# Patient Record
Sex: FEMALE | Race: WHITE | NOT HISPANIC OR LATINO | URBAN - METROPOLITAN AREA
[De-identification: names, ages, dates, MRNs, and addresses within clinical notes are randomized per-mention and may not be internally consistent; named-entity substitution may affect disease eponyms.]

---

## 2017-07-09 ENCOUNTER — EMERGENCY (EMERGENCY)
Facility: HOSPITAL | Age: 82
LOS: 1 days | End: 2017-07-09
Payer: MEDICARE

## 2017-07-09 PROCEDURE — 99285 EMERGENCY DEPT VISIT HI MDM: CPT

## 2017-07-09 PROCEDURE — 74176 CT ABD & PELVIS W/O CONTRAST: CPT | Mod: 26

## 2017-12-19 ENCOUNTER — INPATIENT (INPATIENT)
Facility: HOSPITAL | Age: 82
LOS: 0 days | Discharge: HOME | End: 2017-12-20
Payer: MEDICARE

## 2017-12-19 PROCEDURE — 99285 EMERGENCY DEPT VISIT HI MDM: CPT

## 2017-12-19 PROCEDURE — 71275 CT ANGIOGRAPHY CHEST: CPT | Mod: 26

## 2017-12-19 PROCEDURE — 72128 CT CHEST SPINE W/O DYE: CPT | Mod: 26

## 2017-12-19 PROCEDURE — 74177 CT ABD & PELVIS W/CONTRAST: CPT | Mod: 26

## 2017-12-20 ENCOUNTER — OUTPATIENT (OUTPATIENT)
Dept: OUTPATIENT SERVICES | Facility: HOSPITAL | Age: 82
LOS: 1 days | End: 2017-12-20

## 2018-02-12 ENCOUNTER — OUTPATIENT (OUTPATIENT)
Dept: OUTPATIENT SERVICES | Facility: HOSPITAL | Age: 83
LOS: 1 days | End: 2018-02-12

## 2018-02-20 ENCOUNTER — OUTPATIENT (OUTPATIENT)
Dept: OUTPATIENT SERVICES | Facility: HOSPITAL | Age: 83
LOS: 1 days | End: 2018-02-20

## 2019-07-23 ENCOUNTER — APPOINTMENT (OUTPATIENT)
Dept: RADIOLOGY | Facility: CLINIC | Age: 84
End: 2019-07-23
Payer: MEDICARE

## 2019-07-23 PROBLEM — Z00.00 ENCOUNTER FOR PREVENTIVE HEALTH EXAMINATION: Status: ACTIVE | Noted: 2019-07-23

## 2019-07-23 PROCEDURE — 74019 RADEX ABDOMEN 2 VIEWS: CPT

## 2019-07-31 ENCOUNTER — APPOINTMENT (OUTPATIENT)
Dept: ULTRASOUND IMAGING | Facility: CLINIC | Age: 84
End: 2019-07-31
Payer: MEDICARE

## 2019-07-31 PROCEDURE — 76700 US EXAM ABDOM COMPLETE: CPT

## 2019-08-03 ENCOUNTER — OUTPATIENT (OUTPATIENT)
Dept: OUTPATIENT SERVICES | Facility: HOSPITAL | Age: 84
LOS: 1 days | End: 2019-08-03

## 2019-08-03 ENCOUNTER — INPATIENT (INPATIENT)
Facility: HOSPITAL | Age: 84
LOS: 2 days | Discharge: HOME | End: 2019-08-06
Attending: INTERNAL MEDICINE
Payer: MEDICARE

## 2019-08-03 PROCEDURE — 99291 CRITICAL CARE FIRST HOUR: CPT

## 2019-08-03 PROCEDURE — 70450 CT HEAD/BRAIN W/O DYE: CPT | Mod: 26

## 2019-08-03 PROCEDURE — 71045 X-RAY EXAM CHEST 1 VIEW: CPT | Mod: 26

## 2019-08-03 PROCEDURE — 72125 CT NECK SPINE W/O DYE: CPT | Mod: 26

## 2019-08-04 ENCOUNTER — OUTPATIENT (OUTPATIENT)
Dept: OUTPATIENT SERVICES | Facility: HOSPITAL | Age: 84
LOS: 1 days | End: 2019-08-04

## 2019-08-04 PROCEDURE — 93010 ELECTROCARDIOGRAM REPORT: CPT

## 2019-08-05 ENCOUNTER — OUTPATIENT (OUTPATIENT)
Dept: OUTPATIENT SERVICES | Facility: HOSPITAL | Age: 84
LOS: 1 days | End: 2019-08-05

## 2019-08-06 ENCOUNTER — OUTPATIENT (OUTPATIENT)
Dept: OUTPATIENT SERVICES | Facility: HOSPITAL | Age: 84
LOS: 1 days | End: 2019-08-06

## 2019-12-03 ENCOUNTER — APPOINTMENT (OUTPATIENT)
Dept: ULTRASOUND IMAGING | Facility: CLINIC | Age: 84
End: 2019-12-03
Payer: MEDICARE

## 2019-12-03 PROCEDURE — 93970 EXTREMITY STUDY: CPT

## 2020-01-16 ENCOUNTER — APPOINTMENT (OUTPATIENT)
Dept: CT IMAGING | Facility: CLINIC | Age: 85
End: 2020-01-16
Payer: MEDICARE

## 2020-01-16 PROCEDURE — 74177 CT ABD & PELVIS W/CONTRAST: CPT

## 2021-04-09 ENCOUNTER — APPOINTMENT (OUTPATIENT)
Dept: ULTRASOUND IMAGING | Facility: CLINIC | Age: 86
End: 2021-04-09
Payer: MEDICARE

## 2021-04-09 PROCEDURE — 93971 EXTREMITY STUDY: CPT | Mod: LT

## 2021-12-09 ENCOUNTER — APPOINTMENT (OUTPATIENT)
Dept: ORTHOPEDIC SURGERY | Facility: CLINIC | Age: 86
End: 2021-12-09

## 2022-02-14 ENCOUNTER — APPOINTMENT (OUTPATIENT)
Dept: ORTHOPEDIC SURGERY | Facility: CLINIC | Age: 87
End: 2022-02-14
Payer: MEDICARE

## 2022-02-14 VITALS — WEIGHT: 135 LBS | HEIGHT: 63 IN | TEMPERATURE: 97.4 F | BODY MASS INDEX: 23.92 KG/M2

## 2022-02-14 DIAGNOSIS — H54.8 LEGAL BLINDNESS, AS DEFINED IN USA: ICD-10-CM

## 2022-02-14 DIAGNOSIS — Z96.649 PRESENCE OF UNSPECIFIED ARTIFICIAL HIP JOINT: ICD-10-CM

## 2022-02-14 DIAGNOSIS — Z87.891 PERSONAL HISTORY OF NICOTINE DEPENDENCE: ICD-10-CM

## 2022-02-14 DIAGNOSIS — Z82.49 FAMILY HISTORY OF ISCHEMIC HEART DISEASE AND OTHER DISEASES OF THE CIRCULATORY SYSTEM: ICD-10-CM

## 2022-02-14 DIAGNOSIS — Z83.438 FAMILY HISTORY OF OTHER DISORDER OF LIPOPROTEIN METABOLISM AND OTHER LIPIDEMIA: ICD-10-CM

## 2022-02-14 DIAGNOSIS — Z83.3 FAMILY HISTORY OF DIABETES MELLITUS: ICD-10-CM

## 2022-02-14 DIAGNOSIS — M18.12 UNILATERAL PRIMARY OSTEOARTHRITIS OF FIRST CARPOMETACARPAL JOINT, LEFT HAND: ICD-10-CM

## 2022-02-14 DIAGNOSIS — Z78.9 OTHER SPECIFIED HEALTH STATUS: ICD-10-CM

## 2022-02-14 DIAGNOSIS — Z86.2 PERSONAL HISTORY OF DISEASES OF THE BLOOD AND BLOOD-FORMING ORGANS AND CERTAIN DISORDERS INVOLVING THE IMMUNE MECHANISM: ICD-10-CM

## 2022-02-14 DIAGNOSIS — Z86.79 PERSONAL HISTORY OF OTHER DISEASES OF THE CIRCULATORY SYSTEM: ICD-10-CM

## 2022-02-14 PROCEDURE — 73130 X-RAY EXAM OF HAND: CPT | Mod: LT

## 2022-02-14 PROCEDURE — 20606 DRAIN/INJ JOINT/BURSA W/US: CPT | Mod: LT

## 2022-02-14 PROCEDURE — 99204 OFFICE O/P NEW MOD 45 MIN: CPT | Mod: 25

## 2022-02-14 RX ORDER — LATANOPROST/PF 0.005 %
0.01 DROPS OPHTHALMIC (EYE)
Refills: 0 | Status: ACTIVE | COMMUNITY

## 2022-02-14 RX ORDER — DORZOLAMIDE HYDROCHLORIDE AND TIMOLOL MALEATE 20; 5 MG/ML; MG/ML
SOLUTION/ DROPS OPHTHALMIC
Refills: 0 | Status: ACTIVE | COMMUNITY

## 2022-02-14 NOTE — PHYSICAL EXAM
[de-identified] : - Constitutional: This is a female in no obvious distress. She was accompanied by her son today.\par - She is blind. \par - Psych: Patient is alert and oriented to person, place and time.  Patient has a normal mood and affect.\par - Cardiovascular: Normal pulses throughout the upper extremities.  No significant varicosities are noted in the upper extremities. \par - Neuro: Strength and sensation are intact throughout the upper extremities.  Patient has normal coordination.\par - Respiratory:  Patient exhibits no evidence of shortness of breath or difficulty breathing.\par - Skin: No rashes, lesions, or other abnormalities are noted in the upper extremities.\par \par ---\par \par Side: Left Thumb\par -  There is swelling and tenderness along the basal joint of the thumb.  \par -  There is a positive grind test.  \par -  There is no instability of the basal joint of the thumb.  \par -  There is no evidence of a trigger thumb.  \par -  There is no evidence of DeQuervain's tendonitis.  \par -  Provocative signs for carpal tunnel syndrome are negative.  \par -  There is normal strength and sensation distally along the radial, ulnar and median nerve distributions.  \par -  There is full composite range-of-motion of the other digits into the palm.    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

## 2022-02-14 NOTE — END OF VISIT
[FreeTextEntry3] : This note was written by Melo Champion on 02/14/2022 acting solely as a scribe for Dr. Graham Parra.\par  \par All medical record entries made by the Scribe were at my, Dr. Graham Parra, direction and personally dictated by me on 02/14/2022. I have personally reviewed the chart and agree that the record accurately reflects my personal performance of the history, physical exam.

## 2022-02-14 NOTE — HISTORY OF PRESENT ILLNESS
[Right] : right hand dominant [FreeTextEntry1] : She comes in today for evaluation of left thumb pain  She states that she has difficulty grasping due to pain. She rates her pain a 5 to 6 out of 10 at this time. \par \par She has a history of type 2 diabetes.  She is also blind.\par \par She was accompanied by her son today.

## 2022-02-14 NOTE — ADDENDUM
[FreeTextEntry1] : I, Melo Champion, acted solely as a scribe for Dr. Parra on this date on 02/14/2022.

## 2022-02-14 NOTE — DISCUSSION/SUMMARY
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 At this time, she agreed to proceed with a cortisone injection. She understands that as a diabetic, this will likely elevate her blood sugars in the short-term.\par \par The patient has agreed to the above plan of management and has expressed full understanding.  All questions were fully answered to the patient's satisfaction. \par \par My cumulative time spent on this visit included: Preparation for the visit, review of the medical records, review of pertinent diagnostic studies, examination and counseling of the patient on the above diagnosis, treatment plan and prognosis, orders of diagnostic tests, medication and/or appropriate procedures and documentation in the medical records of today's visit.

## 2022-02-14 NOTE — PROCEDURE
[FreeTextEntry1] :  - After a discussion of risks and benefits, the patient agreed to proceed with a cortisone injection.  \par -  Side Injected: Left thumb carpometacarpal joint.\par -  Medications injected: 0.5cc of 1% Lidocaine and 1cc of 40mg of Depomedrol, using sterile technique.\par - Ultrasound Guidance: Ultrasound guidance was used, because of anatomical considerations and deformity, to confirm correct localization of the needle within the carpometacarpal joint, prior to the injection.  \par -  Patient tolerated the procedure well, without complications.\par -  Patient noted immediate relief of the symptoms, secondary to the anesthetic effects of the injection.\par -  Patient was told that the pain may worsen for a day or two, and should then begin to improve.\par -  Instructions: The patient was instructed on the use of ice, anti-inflammatory agents, or Tylenol, and activity modification.\par -  Follow-up: Within 4 weeks to assess the response to the injection.

## 2022-10-07 ENCOUNTER — APPOINTMENT (OUTPATIENT)
Dept: ORTHOPEDIC SURGERY | Facility: CLINIC | Age: 87
End: 2022-10-07

## 2022-11-21 ENCOUNTER — OFFICE (OUTPATIENT)
Dept: URBAN - METROPOLITAN AREA CLINIC 103 | Facility: CLINIC | Age: 87
Setting detail: OPHTHALMOLOGY
End: 2022-11-21
Payer: MEDICARE

## 2022-11-21 DIAGNOSIS — H04.123: ICD-10-CM

## 2022-11-21 DIAGNOSIS — H40.2213: ICD-10-CM

## 2022-11-21 DIAGNOSIS — Z96.1: ICD-10-CM

## 2022-11-21 DIAGNOSIS — H40.2222: ICD-10-CM

## 2022-11-21 PROCEDURE — 92012 INTRM OPH EXAM EST PATIENT: CPT | Performed by: OPHTHALMOLOGY

## 2022-11-21 ASSESSMENT — REFRACTION_AUTOREFRACTION
OS_CYLINDER: -1.75
OD_SPHERE: UTP
OS_AXIS: 093
OS_SPHERE: -0.50

## 2022-11-21 ASSESSMENT — KERATOMETRY
OD_K2POWER_DIOPTERS: 47.50
METHOD_AUTO_MANUAL: AUTO
OS_K2POWER_DIOPTERS: 48.00
OD_AXISANGLE_DEGREES: 006
OS_AXISANGLE_DEGREES: 004
OD_K1POWER_DIOPTERS: 43.75
OS_K1POWER_DIOPTERS: 46.50

## 2022-11-21 ASSESSMENT — SPHEQUIV_DERIVED: OS_SPHEQUIV: -1.375

## 2022-11-21 ASSESSMENT — CONFRONTATIONAL VISUAL FIELD TEST (CVF)
OD_FINDINGS: FULL
OS_FINDINGS: FULL

## 2022-11-21 ASSESSMENT — DRY EYES - PHYSICIAN NOTES
OS_GENERALCOMMENTS: DIFFUSE
OD_GENERALCOMMENTS: DIFFUSE

## 2022-11-21 ASSESSMENT — VISUAL ACUITY
OD_BCVA: 20/350
OS_BCVA: NLP

## 2022-11-21 ASSESSMENT — SUPERFICIAL PUNCTATE KERATITIS (SPK)
OD_SPK: 3+
OS_SPK: 3+

## 2022-11-21 ASSESSMENT — AXIALLENGTH_DERIVED: OS_AL: 22.7792

## 2022-11-21 ASSESSMENT — PACHYMETRY
OD_CT_CORRECTION: 1
OD_CT_UM: 534

## 2022-11-21 ASSESSMENT — TONOMETRY
OD_IOP_MMHG: 20
OS_IOP_MMHG: 12

## 2022-12-12 ENCOUNTER — OFFICE (OUTPATIENT)
Dept: URBAN - METROPOLITAN AREA CLINIC 105 | Facility: CLINIC | Age: 87
Setting detail: OPHTHALMOLOGY
End: 2022-12-12
Payer: MEDICARE

## 2022-12-12 DIAGNOSIS — H20.9: ICD-10-CM

## 2022-12-12 DIAGNOSIS — E11.3312: ICD-10-CM

## 2022-12-12 DIAGNOSIS — E11.3291: ICD-10-CM

## 2022-12-12 DIAGNOSIS — H43.812: ICD-10-CM

## 2022-12-12 PROCEDURE — 67028 INJECTION EYE DRUG: CPT | Performed by: OPHTHALMOLOGY

## 2022-12-12 PROCEDURE — 92250 FUNDUS PHOTOGRAPHY W/I&R: CPT | Performed by: OPHTHALMOLOGY

## 2022-12-12 ASSESSMENT — CONFRONTATIONAL VISUAL FIELD TEST (CVF)
OD_FINDINGS: FULL
OS_FINDINGS: FULL

## 2022-12-12 ASSESSMENT — KERATOMETRY
OS_AXISANGLE_DEGREES: 004
OS_K1POWER_DIOPTERS: 46.50
OS_K2POWER_DIOPTERS: 48.00
OD_AXISANGLE_DEGREES: 006
METHOD_AUTO_MANUAL: AUTO
OD_K1POWER_DIOPTERS: 43.75
OD_K2POWER_DIOPTERS: 47.50

## 2022-12-12 ASSESSMENT — AXIALLENGTH_DERIVED: OS_AL: 22.7792

## 2022-12-12 ASSESSMENT — REFRACTION_AUTOREFRACTION
OS_CYLINDER: -1.75
OD_SPHERE: UTP
OS_SPHERE: -0.50
OS_AXIS: 093

## 2022-12-12 ASSESSMENT — DRY EYES - PHYSICIAN NOTES
OD_GENERALCOMMENTS: DIFFUSE
OS_GENERALCOMMENTS: DIFFUSE

## 2022-12-12 ASSESSMENT — SPHEQUIV_DERIVED: OS_SPHEQUIV: -1.375

## 2022-12-12 ASSESSMENT — SUPERFICIAL PUNCTATE KERATITIS (SPK)
OS_SPK: 3+
OD_SPK: 3+

## 2022-12-12 ASSESSMENT — VISUAL ACUITY
OD_BCVA: 20/CF@2FT
OS_BCVA: NLP

## 2022-12-29 ENCOUNTER — RESULT REVIEW (OUTPATIENT)
Age: 87
End: 2022-12-29

## 2022-12-29 DIAGNOSIS — E04.2 NONTOXIC MULTINODULAR GOITER: ICD-10-CM

## 2022-12-29 DIAGNOSIS — Z86.39 PERSONAL HISTORY OF OTHER ENDOCRINE, NUTRITIONAL AND METABOLIC DISEASE: ICD-10-CM

## 2023-01-06 ENCOUNTER — APPOINTMENT (OUTPATIENT)
Dept: ULTRASOUND IMAGING | Facility: CLINIC | Age: 88
End: 2023-01-06
Payer: MEDICARE

## 2023-01-06 PROCEDURE — 76536 US EXAM OF HEAD AND NECK: CPT

## 2023-01-10 DIAGNOSIS — M54.2 CERVICALGIA: ICD-10-CM

## 2023-02-13 ENCOUNTER — OFFICE (OUTPATIENT)
Dept: URBAN - METROPOLITAN AREA CLINIC 105 | Facility: CLINIC | Age: 88
Setting detail: OPHTHALMOLOGY
End: 2023-02-13
Payer: MEDICARE

## 2023-02-13 DIAGNOSIS — H43.812: ICD-10-CM

## 2023-02-13 DIAGNOSIS — E11.3312: ICD-10-CM

## 2023-02-13 DIAGNOSIS — E11.3291: ICD-10-CM

## 2023-02-13 DIAGNOSIS — H20.9: ICD-10-CM

## 2023-02-13 PROCEDURE — 92014 COMPRE OPH EXAM EST PT 1/>: CPT | Performed by: OPHTHALMOLOGY

## 2023-02-13 PROCEDURE — 92134 CPTRZ OPH DX IMG PST SGM RTA: CPT | Performed by: OPHTHALMOLOGY

## 2023-02-13 ASSESSMENT — REFRACTION_AUTOREFRACTION
OS_AXIS: 093
OS_SPHERE: -0.50
OS_CYLINDER: -1.75
OD_SPHERE: UTP

## 2023-02-13 ASSESSMENT — DRY EYES - PHYSICIAN NOTES
OD_GENERALCOMMENTS: DIFFUSE
OS_GENERALCOMMENTS: DIFFUSE

## 2023-02-13 ASSESSMENT — VISUAL ACUITY
OD_BCVA: 20/400
OS_BCVA: NLP

## 2023-02-13 ASSESSMENT — KERATOMETRY
OD_K2POWER_DIOPTERS: 47.50
OS_K1POWER_DIOPTERS: 46.50
OD_AXISANGLE_DEGREES: 006
METHOD_AUTO_MANUAL: AUTO
OD_K1POWER_DIOPTERS: 43.75
OS_AXISANGLE_DEGREES: 004
OS_K2POWER_DIOPTERS: 48.00

## 2023-02-13 ASSESSMENT — SUPERFICIAL PUNCTATE KERATITIS (SPK)
OS_SPK: 3+
OD_SPK: 3+

## 2023-02-13 ASSESSMENT — SPHEQUIV_DERIVED: OS_SPHEQUIV: -1.375

## 2023-02-13 ASSESSMENT — AXIALLENGTH_DERIVED: OS_AL: 22.7792

## 2023-02-13 ASSESSMENT — CONFRONTATIONAL VISUAL FIELD TEST (CVF)
OD_FINDINGS: FULL
OS_FINDINGS: FULL

## 2023-04-19 ENCOUNTER — APPOINTMENT (OUTPATIENT)
Dept: ENDOCRINOLOGY | Facility: CLINIC | Age: 88
End: 2023-04-19
Payer: MEDICARE

## 2023-04-19 VITALS
TEMPERATURE: 97.7 F | HEIGHT: 63 IN | HEART RATE: 50 BPM | RESPIRATION RATE: 14 BRPM | OXYGEN SATURATION: 99 % | SYSTOLIC BLOOD PRESSURE: 120 MMHG | BODY MASS INDEX: 23.04 KG/M2 | WEIGHT: 130 LBS | DIASTOLIC BLOOD PRESSURE: 58 MMHG

## 2023-04-19 PROCEDURE — 99214 OFFICE O/P EST MOD 30 MIN: CPT

## 2023-04-19 RX ORDER — METOPROLOL TARTRATE 5 MG/5ML
INJECTION, SOLUTION INTRAVENOUS
Refills: 0 | Status: DISCONTINUED | COMMUNITY
End: 2023-04-19

## 2023-04-19 NOTE — PHYSICAL EXAM
[Alert] : alert [Well Nourished] : well nourished [Normal Sclera/Conjunctiva] : normal sclera/conjunctiva [Normal Outer Ear/Nose] : the ears and nose were normal in appearance [Normal Hearing] : hearing was normal [No Neck Mass] : no neck mass was observed [No Thyroid Nodules] : no palpable thyroid nodules [Normal S1, S2] : normal S1 and S2 [Normal Rate] : heart rate was normal [Regular Rhythm] : with a regular rhythm [Pedal Pulses Normal] : the pedal pulses are present [Not Tender] : non-tender [Soft] : abdomen soft [No HSM] : no hepato-splenomegaly [Normal Supraclavicular Nodes] : no supraclavicular lymphadenopathy [No CVA Tenderness] : no ~M costovertebral angle tenderness [Normal Gait] : normal gait [No Clubbing, Cyanosis] : no clubbing  or cyanosis of the fingernails [No Joint Swelling] : no joint swelling seen [No Rash] : no rash [No Motor Deficits] : the motor exam was normal [Normal Reflexes] : deep tendon reflexes were 2+ and symmetric [No Tremors] : no tremors [Oriented x3] : oriented to person, place, and time [de-identified] : And is legally blind [de-identified] : Slight pitting edema more prominent of the left lower extremity [de-identified] : Deferred [FreeTextEntry1] : Deferred [de-identified] : Denies any polyuria or hypoglycemia [de-identified] : Deferred

## 2023-04-19 NOTE — HISTORY OF PRESENT ILLNESS
[FreeTextEntry1] : This is an 87-year-old white female with a past medical history of a type 2 diabetes hypertension, coronary artery disease and hyperlipidemia presents for routine follow-up.  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  Patient currently is taking glipizide 5 mg tablets every day.  Her main complaint is that she has been experiencing some upper chest pain which is radiating towards the neck area especially at night.  It is not very characteristic it resolves spontaneously.  He is not connected with any abdominal pain nausea vomiting heartburn.  Patient does not monitor her sugar levels due to her disability.  Patient had also been evaluated by her cardiologist who was of the opinion that her chest pain was originating from acid reflux review of systems otherwise negative.  She has not noticed any nausea vomiting abdominal pain but she does complain of mild swelling of the lower extremities.  Physically she is active at home.  She prepares her own meals.

## 2023-04-19 NOTE — ASSESSMENT
[Diabetes Foot Care] : diabetes foot care [Long Term Vascular Complications] : long term vascular complications of diabetes [Carbohydrate Consistent Diet] : carbohydrate consistent diet [Hypoglycemia Management] : hypoglycemia management [Exercise/Effect on Glucose] : exercise/effect on glucose [Self Monitoring of Blood Glucose] : self monitoring of blood glucose [FreeTextEntry1] : Elderly white female with a past medical history of a type 2 diabetes hypertension and coronary artery disease presents for routine follow-up.  Patient is now completely legally blind.  He has been complaining of some chest pain which has been radiating to both the upper neck and left shoulder area.  Work-up has been negative in the past especially an exercise stress test.  Recommendation\par Advised the patient to obtain a complete blood analysis.\par 2.  Patient is advised to follow-up with her GI specialist to rule out any possible gastric esophageal acid reflux.\par 3.  Patient will continue her current medication glipizide 5 mg tablets every day.  Patient is aware of the signs and symptoms of hypoglycemia and its management.\par 4.  Patient will meet with a cardiologist and will discuss her management of the anterior chest pain.  The plan was discussed in detail with the patient and her son thank you

## 2023-04-24 ENCOUNTER — OFFICE (OUTPATIENT)
Dept: URBAN - METROPOLITAN AREA CLINIC 105 | Facility: CLINIC | Age: 88
Setting detail: OPHTHALMOLOGY
End: 2023-04-24
Payer: MEDICARE

## 2023-04-24 DIAGNOSIS — E11.3312: ICD-10-CM

## 2023-04-24 DIAGNOSIS — H20.9: ICD-10-CM

## 2023-04-24 DIAGNOSIS — E11.3291: ICD-10-CM

## 2023-04-24 DIAGNOSIS — H43.812: ICD-10-CM

## 2023-04-24 PROCEDURE — 92012 INTRM OPH EXAM EST PATIENT: CPT | Performed by: OPHTHALMOLOGY

## 2023-04-24 PROCEDURE — 92134 CPTRZ OPH DX IMG PST SGM RTA: CPT | Performed by: OPHTHALMOLOGY

## 2023-04-24 ASSESSMENT — AXIALLENGTH_DERIVED: OS_AL: 22.7792

## 2023-04-24 ASSESSMENT — REFRACTION_AUTOREFRACTION
OS_CYLINDER: -1.75
OS_AXIS: 093
OD_SPHERE: UTP
OS_SPHERE: -0.50

## 2023-04-24 ASSESSMENT — VISUAL ACUITY
OS_BCVA: NLP
OD_BCVA: HM

## 2023-04-24 ASSESSMENT — KERATOMETRY
OS_K2POWER_DIOPTERS: 48.00
METHOD_AUTO_MANUAL: AUTO
OS_AXISANGLE_DEGREES: 004
OD_K1POWER_DIOPTERS: 43.75
OD_AXISANGLE_DEGREES: 006
OD_K2POWER_DIOPTERS: 47.50
OS_K1POWER_DIOPTERS: 46.50

## 2023-04-24 ASSESSMENT — CONFRONTATIONAL VISUAL FIELD TEST (CVF)
OD_FINDINGS: FULL
OS_FINDINGS: FULL

## 2023-04-24 ASSESSMENT — SUPERFICIAL PUNCTATE KERATITIS (SPK)
OS_SPK: 3+
OD_SPK: 3+

## 2023-04-24 ASSESSMENT — DRY EYES - PHYSICIAN NOTES
OD_GENERALCOMMENTS: DIFFUSE
OS_GENERALCOMMENTS: DIFFUSE

## 2023-04-24 ASSESSMENT — SPHEQUIV_DERIVED: OS_SPHEQUIV: -1.375

## 2023-05-08 ENCOUNTER — OFFICE (OUTPATIENT)
Dept: URBAN - METROPOLITAN AREA CLINIC 103 | Facility: CLINIC | Age: 88
Setting detail: OPHTHALMOLOGY
End: 2023-05-08
Payer: MEDICARE

## 2023-05-08 DIAGNOSIS — Z96.1: ICD-10-CM

## 2023-05-08 DIAGNOSIS — H04.123: ICD-10-CM

## 2023-05-08 DIAGNOSIS — H26.493: ICD-10-CM

## 2023-05-08 DIAGNOSIS — H40.2222: ICD-10-CM

## 2023-05-08 DIAGNOSIS — H40.2213: ICD-10-CM

## 2023-05-08 PROCEDURE — 92012 INTRM OPH EXAM EST PATIENT: CPT | Performed by: OPHTHALMOLOGY

## 2023-05-08 ASSESSMENT — DRY EYES - PHYSICIAN NOTES
OS_GENERALCOMMENTS: DIFFUSE
OD_GENERALCOMMENTS: DIFFUSE

## 2023-05-08 ASSESSMENT — CONFRONTATIONAL VISUAL FIELD TEST (CVF)
OS_FINDINGS: FULL
OD_FINDINGS: FULL

## 2023-05-08 ASSESSMENT — PACHYMETRY
OD_CT_CORRECTION: 1
OD_CT_UM: 534

## 2023-05-08 ASSESSMENT — SUPERFICIAL PUNCTATE KERATITIS (SPK)
OD_SPK: 3+
OS_SPK: 3+

## 2023-05-08 ASSESSMENT — TONOMETRY: OD_IOP_MMHG: 20

## 2023-05-12 ENCOUNTER — APPOINTMENT (OUTPATIENT)
Dept: RADIOLOGY | Facility: CLINIC | Age: 88
End: 2023-05-12
Payer: MEDICARE

## 2023-05-12 PROCEDURE — 72040 X-RAY EXAM NECK SPINE 2-3 VW: CPT

## 2023-05-16 ASSESSMENT — VISUAL ACUITY
OD_BCVA: HM
OS_BCVA: NLP

## 2023-05-16 ASSESSMENT — KERATOMETRY
OD_AXISANGLE_DEGREES: 180
OD_K1POWER_DIOPTERS: 43.00
OS_K2POWER_DIOPTERS: 47.50
OS_K1POWER_DIOPTERS: 45.50
OD_K2POWER_DIOPTERS: 48.00
METHOD_AUTO_MANUAL: AUTO
OS_AXISANGLE_DEGREES: 173

## 2023-05-16 ASSESSMENT — AXIALLENGTH_DERIVED
OS_AL: 23.2735
OD_AL: 23.2993

## 2023-05-16 ASSESSMENT — REFRACTION_AUTOREFRACTION
OD_SPHERE: +1.50
OS_CYLINDER: -3.50
OD_CYLINDER: -5.25
OD_AXIS: 092
OS_AXIS: 083
OS_SPHERE: -0.25

## 2023-05-16 ASSESSMENT — SPHEQUIV_DERIVED
OS_SPHEQUIV: -2
OD_SPHEQUIV: -1.125

## 2023-06-12 ENCOUNTER — OFFICE (OUTPATIENT)
Dept: URBAN - METROPOLITAN AREA CLINIC 103 | Facility: CLINIC | Age: 88
Setting detail: OPHTHALMOLOGY
End: 2023-06-12
Payer: MEDICARE

## 2023-06-12 DIAGNOSIS — H04.123: ICD-10-CM

## 2023-06-12 DIAGNOSIS — H26.493: ICD-10-CM

## 2023-06-12 DIAGNOSIS — H40.2213: ICD-10-CM

## 2023-06-12 DIAGNOSIS — H40.2222: ICD-10-CM

## 2023-06-12 DIAGNOSIS — Z96.1: ICD-10-CM

## 2023-06-12 PROCEDURE — 99213 OFFICE O/P EST LOW 20 MIN: CPT | Performed by: OPHTHALMOLOGY

## 2023-06-12 ASSESSMENT — KERATOMETRY
OD_K1POWER_DIOPTERS: 45.75
OS_K1POWER_DIOPTERS: 46.25
OD_AXISANGLE_DEGREES: 164
OS_K2POWER_DIOPTERS: 47.00
METHOD_AUTO_MANUAL: AUTO
OS_AXISANGLE_DEGREES: 178
OD_K2POWER_DIOPTERS: 47.50

## 2023-06-12 ASSESSMENT — CONFRONTATIONAL VISUAL FIELD TEST (CVF)
OS_FINDINGS: FULL
OD_FINDINGS: FULL

## 2023-06-12 ASSESSMENT — AXIALLENGTH_DERIVED: OD_AL: 22.721

## 2023-06-12 ASSESSMENT — SUPERFICIAL PUNCTATE KERATITIS (SPK)
OS_SPK: 3+
OD_SPK: 3+

## 2023-06-12 ASSESSMENT — DRY EYES - PHYSICIAN NOTES
OS_GENERALCOMMENTS: DIFFUSE
OD_GENERALCOMMENTS: DIFFUSE

## 2023-06-12 ASSESSMENT — REFRACTION_AUTOREFRACTION
OD_AXIS: 076
OS_CYLINDER: -2.00
OD_CYLINDER: -3.25
OS_AXIS: 088
OD_SPHERE: +1.00
OS_SPHERE: PLANO

## 2023-06-12 ASSESSMENT — PACHYMETRY
OD_CT_UM: 534
OD_CT_CORRECTION: 1

## 2023-06-12 ASSESSMENT — TONOMETRY: OD_IOP_MMHG: 18

## 2023-06-12 ASSESSMENT — VISUAL ACUITY
OS_BCVA: NLP
OD_BCVA: 20/250

## 2023-06-12 ASSESSMENT — SPHEQUIV_DERIVED: OD_SPHEQUIV: -0.625

## 2023-08-11 ENCOUNTER — OFFICE (OUTPATIENT)
Dept: URBAN - METROPOLITAN AREA CLINIC 105 | Facility: CLINIC | Age: 88
Setting detail: OPHTHALMOLOGY
End: 2023-08-11
Payer: MEDICARE

## 2023-08-11 DIAGNOSIS — H20.9: ICD-10-CM

## 2023-08-11 DIAGNOSIS — E11.3291: ICD-10-CM

## 2023-08-11 DIAGNOSIS — H43.812: ICD-10-CM

## 2023-08-11 DIAGNOSIS — E11.3312: ICD-10-CM

## 2023-08-11 PROCEDURE — 92014 COMPRE OPH EXAM EST PT 1/>: CPT | Performed by: OPHTHALMOLOGY

## 2023-08-11 PROCEDURE — 92134 CPTRZ OPH DX IMG PST SGM RTA: CPT | Performed by: OPHTHALMOLOGY

## 2023-08-11 ASSESSMENT — KERATOMETRY
OS_AXISANGLE_DEGREES: 178
OD_K2POWER_DIOPTERS: 47.50
OD_AXISANGLE_DEGREES: 164
OS_K1POWER_DIOPTERS: 46.25
OD_K1POWER_DIOPTERS: 45.75
OS_K2POWER_DIOPTERS: 47.00
METHOD_AUTO_MANUAL: AUTO

## 2023-08-11 ASSESSMENT — DRY EYES - PHYSICIAN NOTES
OD_GENERALCOMMENTS: DIFFUSE
OS_GENERALCOMMENTS: DIFFUSE

## 2023-08-11 ASSESSMENT — REFRACTION_AUTOREFRACTION
OS_SPHERE: PLANO
OD_CYLINDER: -3.25
OD_AXIS: 076
OD_SPHERE: +1.00
OS_AXIS: 088
OS_CYLINDER: -2.00

## 2023-08-11 ASSESSMENT — SUPERFICIAL PUNCTATE KERATITIS (SPK)
OD_SPK: 3+
OS_SPK: 3+

## 2023-08-11 ASSESSMENT — SPHEQUIV_DERIVED: OD_SPHEQUIV: -0.625

## 2023-08-11 ASSESSMENT — VISUAL ACUITY
OS_BCVA: NLP
OD_BCVA: HM

## 2023-08-11 ASSESSMENT — CONFRONTATIONAL VISUAL FIELD TEST (CVF)
OS_FINDINGS: FULL
OD_FINDINGS: FULL

## 2023-08-11 ASSESSMENT — PACHYMETRY
OD_CT_UM: 534
OD_CT_CORRECTION: 1

## 2023-08-11 ASSESSMENT — AXIALLENGTH_DERIVED: OD_AL: 22.721

## 2023-08-22 ENCOUNTER — APPOINTMENT (OUTPATIENT)
Dept: ENDOCRINOLOGY | Facility: CLINIC | Age: 88
End: 2023-08-22
Payer: MEDICARE

## 2023-08-22 VITALS
TEMPERATURE: 97.5 F | HEIGHT: 63 IN | RESPIRATION RATE: 14 BRPM | OXYGEN SATURATION: 100 % | SYSTOLIC BLOOD PRESSURE: 130 MMHG | HEART RATE: 57 BPM | DIASTOLIC BLOOD PRESSURE: 60 MMHG | WEIGHT: 125 LBS | BODY MASS INDEX: 22.15 KG/M2

## 2023-08-22 PROCEDURE — 99213 OFFICE O/P EST LOW 20 MIN: CPT

## 2023-08-22 NOTE — ASSESSMENT
[Diabetes Foot Care] : diabetes foot care [Long Term Vascular Complications] : long term vascular complications of diabetes [Carbohydrate Consistent Diet] : carbohydrate consistent diet [Importance of Diet and Exercise] : importance of diet and exercise to improve glycemic control, achieve weight loss and improve cardiovascular health [Exercise/Effect on Glucose] : exercise/effect on glucose [Hypoglycemia Management] : hypoglycemia management [Self Monitoring of Blood Glucose] : self monitoring of blood glucose [Retinopathy Screening] : Patient was referred to ophthalmology for retinopathy screening [FreeTextEntry1] : Elderly white female with a past medical history of a type 2 diabetes, hyperlipidemia elderly white female with a past medical history of a type 2 diabetes, hyperlipidemia and who is legally blind secondary to glaucoma presents for routine follow-up.  Patient is on glipizide 5 mg tablet daily which she is tolerating well without any side effects.  The above findings were discussed in detail with the patient.  Recommendation 1.  Patient is advised to continue her current medication which include glipizide, Lopressor, baby aspirin, benazepril 40 mg twice a day, Plavix, furosemide atorvastatin and pantoprazole with potassium supplements 2.  Patient is advised to monitor her dietary intake and to eat at least 2 full meals a day with intermittent snacks. 3.  Signs and symptoms of hypoglycemia and its management were discussed with the patient. 4.  Patient is scheduled to undergo a complete blood analysis with a PCP over the next few days and we will try to obtain a copy of notes.  Thank you for the courtesy of this consultation.

## 2023-08-22 NOTE — HISTORY OF PRESENT ILLNESS
[FreeTextEntry1] : 87-year-old white female with a past medical history of type 2 diabetes, hypertension, coronary artery disease and hyperlipidemia presents for routine follow-up.  Patient informed me that she has recently changed her primary care physician and now she will be followed by me for her diabetes.  According to the patient she is taking glipizide 5 mg tablets every day and seems to be fairly well compliant with the diet.  Her appetite is well.  Unable to monitor her levels fine.  She denies any recent falls or numbness of extremities.  She has not noticed any chest pain shortness of breath and she is eating 2 meals on a daily basis.  No history of any recent hypoglycemic bouts.

## 2023-08-22 NOTE — PHYSICAL EXAM
[Alert] : alert [Well Nourished] : well nourished [No Acute Distress] : no acute distress [Normal Sclera/Conjunctiva] : normal sclera/conjunctiva [EOMI] : extra ocular movement intact [PERRL] : pupils equal, round and reactive to light [No Proptosis] : no proptosis [Normal Outer Ear/Nose] : the ears and nose were normal in appearance [No Neck Mass] : no neck mass was observed [Thyroid Not Enlarged] : the thyroid was not enlarged [No Respiratory Distress] : no respiratory distress [Clear to Auscultation] : lungs were clear to auscultation bilaterally [Normal S1, S2] : normal S1 and S2 [Normal Rate] : heart rate was normal [Regular Rhythm] : with a regular rhythm [Carotids Normal] : carotid pulses were normal with no bruits [Pedal Pulses Normal] : the pedal pulses are present [Normal Bowel Sounds] : normal bowel sounds [Not Tender] : non-tender [Soft] : abdomen soft [No HSM] : no hepato-splenomegaly [Normal Supraclavicular Nodes] : no supraclavicular lymphadenopathy [Normal Anterior Cervical Nodes] : no anterior cervical lymphadenopathy [Normal Posterior Cervical Nodes] : no posterior cervical lymphadenopathy [No CVA Tenderness] : no ~M costovertebral angle tenderness [No Clubbing, Cyanosis] : no clubbing  or cyanosis of the fingernails [No Joint Swelling] : no joint swelling seen [No Motor Deficits] : the motor exam was normal [Normal Reflexes] : deep tendon reflexes were 2+ and symmetric [Oriented x3] : oriented to person, place, and time [Normal Insight/Judgement] : insight and judgment were intact [de-identified] : Trace bilateral edema [de-identified] : Deferred [FreeTextEntry1] : Deferred [de-identified] : Deferred [de-identified] : Deferred

## 2023-09-11 ENCOUNTER — OFFICE (OUTPATIENT)
Dept: URBAN - METROPOLITAN AREA CLINIC 103 | Facility: CLINIC | Age: 88
Setting detail: OPHTHALMOLOGY
End: 2023-09-11
Payer: MEDICARE

## 2023-09-11 DIAGNOSIS — H40.2213: ICD-10-CM

## 2023-09-11 DIAGNOSIS — H40.2222: ICD-10-CM

## 2023-09-11 PROCEDURE — 92012 INTRM OPH EXAM EST PATIENT: CPT | Performed by: OPHTHALMOLOGY

## 2023-09-11 ASSESSMENT — KERATOMETRY
OS_AXISANGLE_DEGREES: 010
OS_K1POWER_DIOPTERS: 47.00
OD_AXISANGLE_DEGREES: 013
METHOD_AUTO_MANUAL: AUTO
OD_K2POWER_DIOPTERS: 49.00
OS_K2POWER_DIOPTERS: 47.75
OD_K1POWER_DIOPTERS: 46.75

## 2023-09-11 ASSESSMENT — REFRACTION_AUTOREFRACTION
OS_CYLINDER: -2.25
OD_AXIS: 095
OD_CYLINDER: -2.75
OD_SPHERE: -1.00
OS_AXIS: 088
OS_SPHERE: -0.50

## 2023-09-11 ASSESSMENT — PACHYMETRY
OD_CT_UM: 534
OD_CT_CORRECTION: 1

## 2023-09-11 ASSESSMENT — SPHEQUIV_DERIVED
OD_SPHEQUIV: -2.375
OS_SPHEQUIV: -1.625

## 2023-09-11 ASSESSMENT — SUPERFICIAL PUNCTATE KERATITIS (SPK)
OS_SPK: 3+
OD_SPK: 3+

## 2023-09-11 ASSESSMENT — CONFRONTATIONAL VISUAL FIELD TEST (CVF)
OS_FINDINGS: FULL
OD_FINDINGS: FULL

## 2023-09-11 ASSESSMENT — AXIALLENGTH_DERIVED
OD_AL: 22.9294
OS_AL: 22.8273

## 2023-09-11 ASSESSMENT — DRY EYES - PHYSICIAN NOTES
OD_GENERALCOMMENTS: DIFFUSE
OS_GENERALCOMMENTS: DIFFUSE

## 2023-09-11 ASSESSMENT — VISUAL ACUITY
OD_BCVA: HM
OS_BCVA: NLP

## 2023-11-03 ENCOUNTER — ASC (OUTPATIENT)
Dept: URBAN - METROPOLITAN AREA SURGERY 8 | Facility: SURGERY | Age: 88
Setting detail: OPHTHALMOLOGY
End: 2023-11-03
Payer: MEDICARE

## 2023-11-03 DIAGNOSIS — H40.1123: ICD-10-CM

## 2023-11-03 PROCEDURE — 66710 CILIARY TRANSSLERAL THERAPY: CPT | Mod: 79,LT | Performed by: OPHTHALMOLOGY

## 2023-11-13 ENCOUNTER — OFFICE (OUTPATIENT)
Dept: URBAN - METROPOLITAN AREA CLINIC 103 | Facility: CLINIC | Age: 88
Setting detail: OPHTHALMOLOGY
End: 2023-11-13
Payer: MEDICARE

## 2023-11-13 DIAGNOSIS — H40.2222: ICD-10-CM

## 2023-11-13 DIAGNOSIS — H40.2213: ICD-10-CM

## 2023-11-13 PROCEDURE — 99024 POSTOP FOLLOW-UP VISIT: CPT | Performed by: OPHTHALMOLOGY

## 2023-11-13 ASSESSMENT — DRY EYES - PHYSICIAN NOTES
OS_GENERALCOMMENTS: DIFFUSE
OD_GENERALCOMMENTS: DIFFUSE

## 2023-11-13 ASSESSMENT — REFRACTION_AUTOREFRACTION
OD_CYLINDER: -2.75
OS_CYLINDER: -2.25
OS_AXIS: 088
OS_SPHERE: -0.50
OD_AXIS: 095
OD_SPHERE: -1.00

## 2023-11-13 ASSESSMENT — CONFRONTATIONAL VISUAL FIELD TEST (CVF)
OS_FINDINGS: FULL
OD_FINDINGS: FULL

## 2023-11-13 ASSESSMENT — SUPERFICIAL PUNCTATE KERATITIS (SPK)
OD_SPK: 3+
OS_SPK: 3+

## 2023-11-13 ASSESSMENT — SPHEQUIV_DERIVED
OS_SPHEQUIV: -1.625
OD_SPHEQUIV: -2.375

## 2023-12-04 ENCOUNTER — OFFICE (OUTPATIENT)
Dept: URBAN - METROPOLITAN AREA CLINIC 103 | Facility: CLINIC | Age: 88
Setting detail: OPHTHALMOLOGY
End: 2023-12-04
Payer: MEDICARE

## 2023-12-04 DIAGNOSIS — E11.3291: ICD-10-CM

## 2023-12-04 DIAGNOSIS — H20.9: ICD-10-CM

## 2023-12-04 DIAGNOSIS — H43.812: ICD-10-CM

## 2023-12-04 DIAGNOSIS — E11.3312: ICD-10-CM

## 2023-12-04 PROCEDURE — 92134 CPTRZ OPH DX IMG PST SGM RTA: CPT | Performed by: OPHTHALMOLOGY

## 2023-12-04 PROCEDURE — 92014 COMPRE OPH EXAM EST PT 1/>: CPT | Performed by: OPHTHALMOLOGY

## 2023-12-04 ASSESSMENT — SUPERFICIAL PUNCTATE KERATITIS (SPK)
OS_SPK: 3+
OD_SPK: 3+

## 2023-12-04 ASSESSMENT — REFRACTION_AUTOREFRACTION
OD_CYLINDER: -2.75
OD_SPHERE: -1.00
OD_AXIS: 095
OS_AXIS: 088
OS_CYLINDER: -2.25
OS_SPHERE: -0.50

## 2023-12-04 ASSESSMENT — SPHEQUIV_DERIVED
OD_SPHEQUIV: -2.375
OS_SPHEQUIV: -1.625

## 2023-12-04 ASSESSMENT — DRY EYES - PHYSICIAN NOTES
OD_GENERALCOMMENTS: DIFFUSE
OS_GENERALCOMMENTS: DIFFUSE

## 2023-12-04 ASSESSMENT — CONFRONTATIONAL VISUAL FIELD TEST (CVF)
OS_FINDINGS: FULL
OD_FINDINGS: FULL

## 2024-02-20 ENCOUNTER — APPOINTMENT (OUTPATIENT)
Dept: ENDOCRINOLOGY | Facility: CLINIC | Age: 89
End: 2024-02-20
Payer: MEDICARE

## 2024-02-20 VITALS
HEIGHT: 63 IN | RESPIRATION RATE: 14 BRPM | BODY MASS INDEX: 22.15 KG/M2 | HEART RATE: 56 BPM | WEIGHT: 125 LBS | TEMPERATURE: 98.3 F | OXYGEN SATURATION: 99 % | SYSTOLIC BLOOD PRESSURE: 180 MMHG | DIASTOLIC BLOOD PRESSURE: 70 MMHG

## 2024-02-20 DIAGNOSIS — E11.9 TYPE 2 DIABETES MELLITUS W/OUT COMPLICATIONS: ICD-10-CM

## 2024-02-20 DIAGNOSIS — H54.8 LEGAL BLINDNESS, AS DEFINED IN USA: ICD-10-CM

## 2024-02-20 LAB
ESTIMATED AVERAGE GLUCOSE: 120
HBA1C MFR BLD HPLC: 5.8

## 2024-02-20 PROCEDURE — G2211 COMPLEX E/M VISIT ADD ON: CPT

## 2024-02-20 PROCEDURE — 99214 OFFICE O/P EST MOD 30 MIN: CPT

## 2024-02-20 RX ORDER — NIFEDIPINE 60 MG/1
60 TABLET, EXTENDED RELEASE ORAL DAILY
Refills: 0 | Status: ACTIVE | COMMUNITY

## 2024-02-20 RX ORDER — ELECTROLYTES/DEXTROSE
SOLUTION, ORAL ORAL
Refills: 0 | Status: ACTIVE | COMMUNITY

## 2024-02-20 RX ORDER — AMLODIPINE BESYLATE AND BENAZEPRIL HYDROCHLORIDE 5; 20 MG/1; MG/1
5-20 CAPSULE ORAL
Refills: 0 | Status: ACTIVE | COMMUNITY

## 2024-02-20 RX ORDER — IRON/IRON ASP GLY/FA/MV-MIN 38 125-25-1MG
TABLET ORAL
Refills: 0 | Status: ACTIVE | COMMUNITY

## 2024-02-20 RX ORDER — FOLIC ACID 1 MG/1
1 TABLET ORAL
Refills: 0 | Status: ACTIVE | COMMUNITY

## 2024-02-20 RX ORDER — ASPIRIN ENTERIC COATED TABLETS 81 MG 81 MG/1
81 TABLET, DELAYED RELEASE ORAL DAILY
Refills: 0 | Status: ACTIVE | COMMUNITY

## 2024-02-20 RX ORDER — ATORVASTATIN CALCIUM 40 MG/1
40 TABLET, FILM COATED ORAL DAILY
Refills: 0 | Status: ACTIVE | COMMUNITY

## 2024-02-20 NOTE — ASSESSMENT
[Diabetes Foot Care] : diabetes foot care [Long Term Vascular Complications] : long term vascular complications of diabetes [Carbohydrate Consistent Diet] : carbohydrate consistent diet [Importance of Diet and Exercise] : importance of diet and exercise to improve glycemic control, achieve weight loss and improve cardiovascular health [Exercise/Effect on Glucose] : exercise/effect on glucose [Hypoglycemia Management] : hypoglycemia management [Self Monitoring of Blood Glucose] : self monitoring of blood glucose [Sick-Day Management] : sick-day management [Retinopathy Screening] : Patient was referred to ophthalmology for retinopathy screening [FreeTextEntry1] : Delivered female with a past medical history of for type 2 diabetes complicated with blindness of the right eye, arthritis, coronary artery disease and placement of a stent.  Patient recently had a blood test performed February 7 of this year and this showed that the manage 34 creatinine 1.03 GFR is 53 fasting glucose was 101 mg per DL.  Also her urinalysis was normal except for trace amount of protein.  Her A1c was 5., albumin/ratio is 221.  My impression is that the patient is having stable glycemic control on 1 dose of Glucotrol XL.  She is fairly well compliant with her diet and her weight has remained stable.  Patient is also legally blind from the right eye.  Currently clinically stable from endocrine aspect.  Recommendation 1.  I have advised the patient to continue her medication Glucotrol XL 5 mg daily 2.  Patient will continue with all the other remaining medications 3.  Patient was encouraged to walk and exercise regularly. 4.  Fall precautions were also discussed with the patient as she is a higher for falls and fractures of the bone 5. If the patient condition remained stable then she will have a repeat blood test in approximately 4 to 5 months after which she will follow-up in our office.

## 2024-02-20 NOTE — REVIEW OF SYSTEMS
[Chest Pain] : no chest pain [Palpitations] : no palpitations [Dizziness] : no dizziness [Tremors] : no tremors [Negative] : Heme/Lymph [FreeTextEntry3] : Legally blind in the right eye

## 2024-02-20 NOTE — PHYSICAL EXAM
[Alert] : alert [Well Nourished] : well nourished [Healthy Appearance] : healthy appearance [No Acute Distress] : no acute distress [Well Developed] : well developed [Normal Sclera/Conjunctiva] : normal sclera/conjunctiva [EOMI] : extra ocular movement intact [No Proptosis] : no proptosis [Normal Outer Ear/Nose] : the ears and nose were normal in appearance [Normal Oropharynx] : the oropharynx was normal [Thyroid Not Enlarged] : the thyroid was not enlarged [No Thyroid Nodules] : no palpable thyroid nodules [No Respiratory Distress] : no respiratory distress [No Accessory Muscle Use] : no accessory muscle use [Clear to Auscultation] : lungs were clear to auscultation bilaterally [Normal S1, S2] : normal S1 and S2 [Normal Rate] : heart rate was normal [Regular Rhythm] : with a regular rhythm [Carotids Normal] : carotid pulses were normal with no bruits [No Edema] : no peripheral edema [Pedal Pulses Normal] : the pedal pulses are present [Normal Bowel Sounds] : normal bowel sounds [Not Tender] : non-tender [Not Distended] : not distended [Soft] : abdomen soft [Normal Supraclavicular Nodes] : no supraclavicular lymphadenopathy [Normal Anterior Cervical Nodes] : no anterior cervical lymphadenopathy [Normal Posterior Cervical Nodes] : no posterior cervical lymphadenopathy [No CVA Tenderness] : no ~M costovertebral angle tenderness [No Spinal Tenderness] : no spinal tenderness [Spine Straight] : spine straight [No Stigmata of Cushings Syndrome] : no stigmata of Cushings Syndrome [Normal Gait] : normal gait [Normal Strength/Tone] : muscle strength and tone were normal [No Rash] : no rash [Acanthosis Nigricans] : no acanthosis nigricans [No Motor Deficits] : the motor exam was normal [Normal Reflexes] : deep tendon reflexes were 2+ and symmetric [No Tremors] : no tremors [Oriented x3] : oriented to person, place, and time

## 2024-02-20 NOTE — HISTORY OF PRESENT ILLNESS
[FreeTextEntry1] : Patient last seen 8/22/2023 most recent labs dated for 2/7/2024 from [facility]. Weigh today 125 per pt  88-year-old white female with a past medical history of a type 2 diabetes, generalized osteoarthritis, hypertension coronary artery disease status post stenting and also the patient is legally blind in the right eye.  She is currently taking Glucotrol XL 5 mg tablets daily.  Patient denies any significant symptoms.  She denies any chest pains shortness of breath nausea vomiting abdominal pain constipation or diarrhea or headaches.  Patient claimed that her is good and her weight has remained stable.  She denies any number extremities.  She does have a history of anemia, hip replacement, primary hypothyroidism and thyroid nodules.  Her medications include nifedipine ER 60 mg tablets daily, Lotrel 5/20 mg 1 daily Lipitor 40 mg daily iron tablets, furosemide 20 mg daily but the patient reports that she stopped taking it.,  Furosemide 20 mg daily uric acid 1 mg daily Plavix 75 mg daily benazepril 40 mg twice a day and a baby aspirin

## 2024-03-11 ENCOUNTER — OFFICE (OUTPATIENT)
Dept: URBAN - METROPOLITAN AREA CLINIC 103 | Facility: CLINIC | Age: 89
Setting detail: OPHTHALMOLOGY
End: 2024-03-11
Payer: MEDICARE

## 2024-03-11 DIAGNOSIS — H40.2222: ICD-10-CM

## 2024-03-11 DIAGNOSIS — H40.2213: ICD-10-CM

## 2024-03-11 PROCEDURE — 92012 INTRM OPH EXAM EST PATIENT: CPT | Performed by: OPHTHALMOLOGY

## 2024-03-29 ENCOUNTER — APPOINTMENT (OUTPATIENT)
Dept: ULTRASOUND IMAGING | Facility: CLINIC | Age: 89
End: 2024-03-29
Payer: MEDICARE

## 2024-03-29 PROCEDURE — 76775 US EXAM ABDO BACK WALL LIM: CPT | Mod: XU

## 2024-03-29 PROCEDURE — 93975 VASCULAR STUDY: CPT

## 2024-04-09 ENCOUNTER — RESULT REVIEW (OUTPATIENT)
Age: 89
End: 2024-04-09

## 2024-04-10 ENCOUNTER — TRANSCRIPTION ENCOUNTER (OUTPATIENT)
Age: 89
End: 2024-04-10

## 2024-05-27 DIAGNOSIS — E11.9 TYPE 2 DIABETES MELLITUS W/OUT COMPLICATIONS: ICD-10-CM

## 2024-05-27 DIAGNOSIS — I10 ESSENTIAL (PRIMARY) HYPERTENSION: ICD-10-CM

## 2024-05-27 DIAGNOSIS — Z86.39 PERSONAL HISTORY OF OTHER ENDOCRINE, NUTRITIONAL AND METABOLIC DISEASE: ICD-10-CM

## 2024-05-27 DIAGNOSIS — I25.10 ATHEROSCLEROTIC HEART DISEASE OF NATIVE CORONARY ARTERY W/OUT ANGINA PECTORIS: ICD-10-CM

## 2024-05-27 NOTE — HISTORY OF PRESENT ILLNESS
[FreeTextEntry1] : 89 yo F here for cv evaluation as she had [facility] presentation 4/2024 for hypertensive emergency and type 2 NSTEMI. Found to have bystander CAD with ostial RCA  of stent.   We plan to optimize her cv risk factors of HTN and HLD. She is following with endocrinology for DM2 and hematology for iron deficiency.

## 2024-05-29 ENCOUNTER — APPOINTMENT (OUTPATIENT)
Dept: CARDIOLOGY | Facility: CLINIC | Age: 89
End: 2024-05-29

## 2024-07-08 ENCOUNTER — OFFICE (OUTPATIENT)
Dept: URBAN - METROPOLITAN AREA CLINIC 103 | Facility: CLINIC | Age: 89
Setting detail: OPHTHALMOLOGY
End: 2024-07-08
Payer: MEDICARE

## 2024-07-08 DIAGNOSIS — H40.2222: ICD-10-CM

## 2024-07-08 DIAGNOSIS — H40.2213: ICD-10-CM

## 2024-07-08 PROCEDURE — 92012 INTRM OPH EXAM EST PATIENT: CPT | Performed by: OPHTHALMOLOGY

## 2024-07-08 RX ORDER — ATORVASTATIN CALCIUM 40 MG/1
40 TABLET, FILM COATED ORAL
Qty: 90 | Refills: 1 | Status: ACTIVE | COMMUNITY
Start: 2024-07-08 | End: 1900-01-01

## 2024-07-08 ASSESSMENT — CONFRONTATIONAL VISUAL FIELD TEST (CVF)
OD_FINDINGS: FULL
OS_FINDINGS: FULL

## 2024-07-16 ENCOUNTER — APPOINTMENT (OUTPATIENT)
Dept: RADIOLOGY | Facility: CLINIC | Age: 89
End: 2024-07-16
Payer: MEDICARE

## 2024-07-16 PROCEDURE — 73502 X-RAY EXAM HIP UNI 2-3 VIEWS: CPT | Mod: RT

## 2024-07-16 PROCEDURE — 72040 X-RAY EXAM NECK SPINE 2-3 VW: CPT

## 2024-07-23 ENCOUNTER — APPOINTMENT (OUTPATIENT)
Dept: CT IMAGING | Facility: CLINIC | Age: 89
End: 2024-07-23
Payer: MEDICARE

## 2024-07-23 PROCEDURE — 73700 CT LOWER EXTREMITY W/O DYE: CPT | Mod: RT

## 2024-09-03 ENCOUNTER — APPOINTMENT (OUTPATIENT)
Dept: ENDOCRINOLOGY | Facility: CLINIC | Age: 89
End: 2024-09-03
Payer: MEDICARE

## 2024-09-03 VITALS
SYSTOLIC BLOOD PRESSURE: 140 MMHG | WEIGHT: 125 LBS | DIASTOLIC BLOOD PRESSURE: 62 MMHG | BODY MASS INDEX: 22.15 KG/M2 | HEART RATE: 48 BPM | HEIGHT: 63 IN | OXYGEN SATURATION: 100 %

## 2024-09-03 DIAGNOSIS — E11.9 TYPE 2 DIABETES MELLITUS W/OUT COMPLICATIONS: ICD-10-CM

## 2024-09-03 DIAGNOSIS — H54.8 LEGAL BLINDNESS, AS DEFINED IN USA: ICD-10-CM

## 2024-09-03 DIAGNOSIS — I10 ESSENTIAL (PRIMARY) HYPERTENSION: ICD-10-CM

## 2024-09-03 PROCEDURE — G2211 COMPLEX E/M VISIT ADD ON: CPT

## 2024-09-03 PROCEDURE — 99214 OFFICE O/P EST MOD 30 MIN: CPT

## 2024-09-03 RX ORDER — NATEGLINIDE 60 MG/1
60 TABLET, COATED ORAL
Qty: 180 | Refills: 0 | Status: ACTIVE | COMMUNITY
Start: 2024-09-03 | End: 1900-01-01

## 2024-09-03 NOTE — PHYSICAL EXAM
[Alert] : alert [Well Nourished] : well nourished [No Acute Distress] : no acute distress [Well Developed] : well developed [Normal Sclera/Conjunctiva] : normal sclera/conjunctiva [EOMI] : extra ocular movement intact [No Proptosis] : no proptosis [Normal Oropharynx] : the oropharynx was normal [Thyroid Not Enlarged] : the thyroid was not enlarged [No Thyroid Nodules] : no palpable thyroid nodules [No Respiratory Distress] : no respiratory distress [No Accessory Muscle Use] : no accessory muscle use [Clear to Auscultation] : lungs were clear to auscultation bilaterally [Normal S1, S2] : normal S1 and S2 [Normal Rate] : heart rate was normal [Regular Rhythm] : with a regular rhythm [Pedal Pulses Normal] : the pedal pulses are present [Normal Bowel Sounds] : normal bowel sounds [Not Tender] : non-tender [Not Distended] : not distended [Soft] : abdomen soft [Normal Supraclavicular Nodes] : no supraclavicular lymphadenopathy [Normal Anterior Cervical Nodes] : no anterior cervical lymphadenopathy [Normal Posterior Cervical Nodes] : no posterior cervical lymphadenopathy [No Spinal Tenderness] : no spinal tenderness [Spine Straight] : spine straight [No Stigmata of Cushings Syndrome] : no stigmata of Cushings Syndrome [No Joint Swelling] : no joint swelling seen [No Rash] : no rash [Acanthosis Nigricans] : no acanthosis nigricans [No Motor Deficits] : the motor exam was normal [Normal Reflexes] : deep tendon reflexes were 2+ and symmetric [No Tremors] : no tremors [Oriented x3] : oriented to person, place, and time [de-identified] :  bilateral trace edema over the ankles

## 2024-09-03 NOTE — REVIEW OF SYSTEMS
[Fatigue] : fatigue [Decreased Appetite] : decreased appetite [Lower Ext Edema] : lower extremity edema [Polyuria] : polyuria [Dysuria] : no dysuria [Incontinence] : no incontinence [Muscle Weakness] : muscle weakness [Joint Stiffness] : joint stiffness [Difficulty Walking] : difficulty walking [Poor Balance] : poor balance [Negative] : Heme/Lymph [FreeTextEntry3] :  legally blind

## 2024-09-03 NOTE — HISTORY OF PRESENT ILLNESS
[FreeTextEntry1] : 88-year-old white elderly female Dx with DM2, generalized osteoarthritis, hypertension CAD status post stenting also she is legally blind with the assistance of her son. Blood work 8/17/24 shows her A1C of 6.2 and TSH level of 1.800. She has a past Hx of anemia, hip replacement,  She states that her appetite has subsided lately but slowly getting better Patient is not physically active she cannot ambulate well. Patient denies any chest pain numbness or tingling but states that she often has SOB while ambulating .  Patient recently had an episode of congestive heart failure for which she was placed on a diuretic which apparently caused her to develop prerenal azotemia.  Also the dose of the Lipitor was increased from 40 to 80 mg daily.  Patient is unable to monitor her sugar levels at home but she does eat on a regular basis mostly small meals.  She denies any signs or symptoms of hypoglycemia patient is legally blind and lives alone.Patient currently is taking glipizide XL 5 mg tablets every day in the morning.

## 2024-09-03 NOTE — ASSESSMENT
[Diabetes Foot Care] : diabetes foot care [Long Term Vascular Complications] : long term vascular complications of diabetes [Carbohydrate Consistent Diet] : carbohydrate consistent diet [Importance of Diet and Exercise] : importance of diet and exercise to improve glycemic control, achieve weight loss and improve cardiovascular health [Exercise/Effect on Glucose] : exercise/effect on glucose [Hypoglycemia Management] : hypoglycemia management [Retinopathy Screening] : Patient was referred to ophthalmology for retinopathy screening [FreeTextEntry1] :    Elderly white female with a past medical history of a type 2 diabetes complicated with severe retinopathy presents for routine follow-up.  She is currently maintained on Glucotrol XL 5 mg tablets every day and her last hemoglobin A1c is 6.2%.  She also had a complete metabolic panel drawn on 16 August which showed that the BUN was 57 creatinine 1.46 and the glucose was 149 mg per DL.  TSH level was 1.8.  Patient is fairly well compliant with her diet but currently she is seems to be quite depressed due to the recent event where she had developed severe swelling of the lower extremities the cause of this is not known to the patient but I suspect that it was because of underlying congestive heart failure.  She had also developed a prerenal azotemia. recommendation 1.  In view of the fact that the patient is taking a long-acting oral glucose agent the risk of hypoglycemia remains significantly elevated especially on the background of chronic kidney disease.  For this reason I have suggested to the patient to discontinue the Glucotrol and to give a trial of a short acting secretagogue such as nateglinide 60 mg tablets 3 times a day before meals. 2.  Signs and symptoms of hypoglycemia were explained to the patient in detail together with his management. 3.  The importance of diet and regular meals was explained to the patient. 4.  If the patient remains clinically stable she will then follow-up in approximately 4 months time.  Thank you

## 2024-09-11 ENCOUNTER — APPOINTMENT (OUTPATIENT)
Dept: ULTRASOUND IMAGING | Facility: CLINIC | Age: 89
End: 2024-09-11

## 2024-10-21 ENCOUNTER — APPOINTMENT (OUTPATIENT)
Dept: ULTRASOUND IMAGING | Facility: CLINIC | Age: 89
End: 2024-10-21
Payer: MEDICARE

## 2024-10-21 PROCEDURE — 76700 US EXAM ABDOM COMPLETE: CPT

## 2024-10-21 PROCEDURE — 76856 US EXAM PELVIC COMPLETE: CPT

## 2024-11-22 ENCOUNTER — OFFICE (OUTPATIENT)
Dept: URBAN - METROPOLITAN AREA CLINIC 105 | Facility: CLINIC | Age: 89
Setting detail: OPHTHALMOLOGY
End: 2024-11-22
Payer: MEDICARE

## 2024-11-22 DIAGNOSIS — E11.3291: ICD-10-CM

## 2024-11-22 DIAGNOSIS — E11.3312: ICD-10-CM

## 2024-11-22 PROCEDURE — 92134 CPTRZ OPH DX IMG PST SGM RTA: CPT | Performed by: OPHTHALMOLOGY

## 2024-11-22 PROCEDURE — 67028 INJECTION EYE DRUG: CPT | Mod: LT | Performed by: OPHTHALMOLOGY

## 2024-11-22 ASSESSMENT — PACHYMETRY
OD_CT_UM: 534
OD_CT_CORRECTION: 1

## 2024-11-22 ASSESSMENT — REFRACTION_AUTOREFRACTION
OD_SPHERE: UTP
OS_SPHERE: UTP

## 2024-11-22 ASSESSMENT — KERATOMETRY
METHOD_AUTO_MANUAL: AUTO
OS_K1POWER_DIOPTERS: UTP
OD_K1POWER_DIOPTERS: UTP

## 2024-11-22 ASSESSMENT — VISUAL ACUITY
OD_BCVA: HM
OS_BCVA: NLP

## 2024-11-22 ASSESSMENT — CONFRONTATIONAL VISUAL FIELD TEST (CVF)
OD_FINDINGS: FULL
OS_FINDINGS: FULL

## 2024-11-22 ASSESSMENT — SUPERFICIAL PUNCTATE KERATITIS (SPK)
OD_SPK: 3+
OS_SPK: 3+

## 2024-11-22 ASSESSMENT — DRY EYES - PHYSICIAN NOTES
OD_GENERALCOMMENTS: DIFFUSE
OS_GENERALCOMMENTS: DIFFUSE

## 2024-12-02 ENCOUNTER — RX RENEWAL (OUTPATIENT)
Age: 89
End: 2024-12-02

## 2024-12-04 ENCOUNTER — APPOINTMENT (OUTPATIENT)
Dept: ENDOCRINOLOGY | Facility: CLINIC | Age: 89
End: 2024-12-04

## 2024-12-10 PROBLEM — R73.09 ELEVATED HEMOGLOBIN A1C: Status: ACTIVE | Noted: 2024-12-10

## 2024-12-11 ENCOUNTER — OFFICE (OUTPATIENT)
Dept: URBAN - METROPOLITAN AREA CLINIC 103 | Facility: CLINIC | Age: 89
Setting detail: OPHTHALMOLOGY
End: 2024-12-11
Payer: MEDICARE

## 2024-12-11 DIAGNOSIS — E11.3312: ICD-10-CM

## 2024-12-11 DIAGNOSIS — H43.812: ICD-10-CM

## 2024-12-11 DIAGNOSIS — E11.3291: ICD-10-CM

## 2024-12-11 DIAGNOSIS — H20.9: ICD-10-CM

## 2024-12-11 PROCEDURE — 92134 CPTRZ OPH DX IMG PST SGM RTA: CPT | Performed by: OPHTHALMOLOGY

## 2024-12-11 PROCEDURE — 92012 INTRM OPH EXAM EST PATIENT: CPT | Performed by: OPHTHALMOLOGY

## 2024-12-11 ASSESSMENT — SUPERFICIAL PUNCTATE KERATITIS (SPK)
OD_SPK: 3+
OS_SPK: 3+

## 2024-12-11 ASSESSMENT — VISUAL ACUITY
OS_BCVA: NLP
OD_BCVA: HM

## 2024-12-11 ASSESSMENT — REFRACTION_AUTOREFRACTION
OD_SPHERE: UTP
OS_SPHERE: UTP

## 2024-12-11 ASSESSMENT — DRY EYES - PHYSICIAN NOTES
OD_GENERALCOMMENTS: DIFFUSE
OS_GENERALCOMMENTS: DIFFUSE

## 2024-12-11 ASSESSMENT — KERATOMETRY
METHOD_AUTO_MANUAL: AUTO
OS_K1POWER_DIOPTERS: UTP
OD_K1POWER_DIOPTERS: UTP

## 2024-12-24 ENCOUNTER — RX RENEWAL (OUTPATIENT)
Age: 89
End: 2024-12-24

## 2024-12-24 RX ORDER — GLIPIZIDE 5 MG/1
5 TABLET, FILM COATED, EXTENDED RELEASE ORAL
Qty: 90 | Refills: 3 | Status: ACTIVE | COMMUNITY
Start: 2024-12-24 | End: 1900-01-01

## 2025-01-02 ENCOUNTER — APPOINTMENT (OUTPATIENT)
Dept: MAMMOGRAPHY | Facility: CLINIC | Age: 89
End: 2025-01-02
Payer: MEDICARE

## 2025-01-02 ENCOUNTER — APPOINTMENT (OUTPATIENT)
Dept: ULTRASOUND IMAGING | Facility: CLINIC | Age: 89
End: 2025-01-02

## 2025-01-02 PROCEDURE — 76642 ULTRASOUND BREAST LIMITED: CPT | Mod: LT

## 2025-01-02 PROCEDURE — 77066 DX MAMMO INCL CAD BI: CPT

## 2025-01-02 PROCEDURE — G0279: CPT

## 2025-01-13 ENCOUNTER — OFFICE (OUTPATIENT)
Dept: URBAN - METROPOLITAN AREA CLINIC 103 | Facility: CLINIC | Age: 89
Setting detail: OPHTHALMOLOGY
End: 2025-01-13
Payer: MEDICARE

## 2025-01-13 DIAGNOSIS — H40.2222: ICD-10-CM

## 2025-01-13 DIAGNOSIS — H40.2213: ICD-10-CM

## 2025-01-13 PROCEDURE — 92012 INTRM OPH EXAM EST PATIENT: CPT | Performed by: OPHTHALMOLOGY

## 2025-01-13 ASSESSMENT — SUPERFICIAL PUNCTATE KERATITIS (SPK)
OD_SPK: 3+
OS_SPK: 3+

## 2025-01-13 ASSESSMENT — VISUAL ACUITY
OS_BCVA: NLP
OD_BCVA: LP

## 2025-01-13 ASSESSMENT — REFRACTION_AUTOREFRACTION
OD_SPHERE: UTP
OS_SPHERE: UTP

## 2025-01-13 ASSESSMENT — PACHYMETRY
OD_CT_UM: 534
OD_CT_CORRECTION: 1

## 2025-01-13 ASSESSMENT — CONFRONTATIONAL VISUAL FIELD TEST (CVF)
OS_FINDINGS: FULL
OD_FINDINGS: FULL

## 2025-01-13 ASSESSMENT — DRY EYES - PHYSICIAN NOTES
OS_GENERALCOMMENTS: DIFFUSE
OD_GENERALCOMMENTS: DIFFUSE

## 2025-01-13 ASSESSMENT — TONOMETRY
OD_IOP_MMHG: 20
OS_IOP_MMHG: 13

## 2025-01-13 ASSESSMENT — KERATOMETRY
METHOD_AUTO_MANUAL: AUTO
OD_K1POWER_DIOPTERS: UTP
OS_K1POWER_DIOPTERS: UTP

## 2025-01-21 ENCOUNTER — RESULT REVIEW (OUTPATIENT)
Age: 89
End: 2025-01-21

## 2025-02-24 ENCOUNTER — NON-APPOINTMENT (OUTPATIENT)
Age: 89
End: 2025-02-24

## 2025-02-24 ENCOUNTER — APPOINTMENT (OUTPATIENT)
Dept: CARDIOLOGY | Facility: CLINIC | Age: 89
End: 2025-02-24
Payer: MEDICARE

## 2025-02-24 VITALS
WEIGHT: 106 LBS | SYSTOLIC BLOOD PRESSURE: 138 MMHG | BODY MASS INDEX: 18.78 KG/M2 | DIASTOLIC BLOOD PRESSURE: 70 MMHG | HEIGHT: 63 IN | OXYGEN SATURATION: 99 % | HEART RATE: 88 BPM

## 2025-02-24 DIAGNOSIS — E11.9 TYPE 2 DIABETES MELLITUS W/OUT COMPLICATIONS: ICD-10-CM

## 2025-02-24 DIAGNOSIS — R73.09 OTHER ABNORMAL GLUCOSE: ICD-10-CM

## 2025-02-24 DIAGNOSIS — Z86.39 PERSONAL HISTORY OF OTHER ENDOCRINE, NUTRITIONAL AND METABOLIC DISEASE: ICD-10-CM

## 2025-02-24 DIAGNOSIS — H54.8 LEGAL BLINDNESS, AS DEFINED IN USA: ICD-10-CM

## 2025-02-24 DIAGNOSIS — I25.10 ATHEROSCLEROTIC HEART DISEASE OF NATIVE CORONARY ARTERY W/OUT ANGINA PECTORIS: ICD-10-CM

## 2025-02-24 PROCEDURE — 99205 OFFICE O/P NEW HI 60 MIN: CPT

## 2025-02-25 ENCOUNTER — APPOINTMENT (OUTPATIENT)
Dept: RADIOLOGY | Facility: CLINIC | Age: 89
End: 2025-02-25
Payer: MEDICARE

## 2025-02-25 PROCEDURE — 71046 X-RAY EXAM CHEST 2 VIEWS: CPT

## 2025-02-26 ENCOUNTER — NON-APPOINTMENT (OUTPATIENT)
Age: 89
End: 2025-02-26

## 2025-03-07 ENCOUNTER — APPOINTMENT (OUTPATIENT)
Dept: CARDIOLOGY | Facility: CLINIC | Age: 89
End: 2025-03-07
Payer: MEDICARE

## 2025-03-07 VITALS
SYSTOLIC BLOOD PRESSURE: 108 MMHG | OXYGEN SATURATION: 97 % | BODY MASS INDEX: 18.25 KG/M2 | HEIGHT: 63 IN | HEART RATE: 67 BPM | DIASTOLIC BLOOD PRESSURE: 62 MMHG | WEIGHT: 103 LBS

## 2025-03-07 DIAGNOSIS — I10 ESSENTIAL (PRIMARY) HYPERTENSION: ICD-10-CM

## 2025-03-07 DIAGNOSIS — J90 PLEURAL EFFUSION, NOT ELSEWHERE CLASSIFIED: ICD-10-CM

## 2025-03-07 DIAGNOSIS — I50.30 UNSPECIFIED DIASTOLIC (CONGESTIVE) HEART FAILURE: ICD-10-CM

## 2025-03-07 PROCEDURE — 99214 OFFICE O/P EST MOD 30 MIN: CPT

## 2025-03-07 PROCEDURE — G2211 COMPLEX E/M VISIT ADD ON: CPT

## 2025-03-07 RX ORDER — BIMATOPROST 0.1 MG/ML
0.01 SOLUTION/ DROPS OPHTHALMIC
Refills: 0 | Status: ACTIVE | COMMUNITY

## 2025-03-07 RX ORDER — NETARSUDIL 0.2 MG/ML
0.02 SOLUTION/ DROPS OPHTHALMIC; TOPICAL
Refills: 0 | Status: ACTIVE | COMMUNITY

## 2025-03-07 RX ORDER — CARVEDILOL 25 MG/1
25 TABLET, FILM COATED ORAL TWICE DAILY
Refills: 0 | Status: ACTIVE | COMMUNITY

## 2025-03-07 RX ORDER — CLOPIDOGREL BISULFATE 75 MG/1
75 TABLET, FILM COATED ORAL DAILY
Refills: 0 | Status: ACTIVE | COMMUNITY

## 2025-03-07 RX ORDER — TORSEMIDE 20 MG/1
20 TABLET ORAL
Qty: 30 | Refills: 0 | Status: DISCONTINUED | COMMUNITY
Start: 2025-02-24 | End: 2025-03-07

## 2025-03-07 RX ORDER — FUROSEMIDE 20 MG/1
20 TABLET ORAL DAILY
Refills: 0 | Status: ACTIVE | COMMUNITY

## 2025-03-07 RX ORDER — SPIRONOLACTONE 25 MG/1
25 TABLET ORAL DAILY
Refills: 0 | Status: ACTIVE | COMMUNITY

## 2025-03-07 RX ORDER — RANOLAZINE 500 MG/1
500 TABLET, EXTENDED RELEASE ORAL
Refills: 0 | Status: ACTIVE | COMMUNITY

## 2025-03-17 ENCOUNTER — APPOINTMENT (OUTPATIENT)
Dept: CARDIOLOGY | Facility: CLINIC | Age: 89
End: 2025-03-17

## 2025-03-21 ENCOUNTER — APPOINTMENT (OUTPATIENT)
Dept: CARDIOLOGY | Facility: CLINIC | Age: 89
End: 2025-03-21